# Patient Record
Sex: MALE | Race: WHITE | Employment: OTHER | URBAN - METROPOLITAN AREA
[De-identification: names, ages, dates, MRNs, and addresses within clinical notes are randomized per-mention and may not be internally consistent; named-entity substitution may affect disease eponyms.]

---

## 2021-04-26 ENCOUNTER — OFFICE VISIT (OUTPATIENT)
Dept: PODIATRY | Age: 71
End: 2021-04-26
Payer: MEDICARE

## 2021-04-26 VITALS — WEIGHT: 190 LBS | HEIGHT: 68 IN | BODY MASS INDEX: 28.79 KG/M2

## 2021-04-26 DIAGNOSIS — R26.2 DIFFICULTY WALKING: ICD-10-CM

## 2021-04-26 DIAGNOSIS — M79.675 PAIN OF TOE OF LEFT FOOT: ICD-10-CM

## 2021-04-26 DIAGNOSIS — L60.0 OC (ONYCHOCRYPTOSIS): Primary | ICD-10-CM

## 2021-04-26 PROCEDURE — 99203 OFFICE O/P NEW LOW 30 MIN: CPT | Performed by: PODIATRIST

## 2021-04-26 PROCEDURE — 11750 EXCISION NAIL&NAIL MATRIX: CPT | Performed by: PODIATRIST

## 2021-04-26 SDOH — HEALTH STABILITY: MENTAL HEALTH: HOW MANY STANDARD DRINKS CONTAINING ALCOHOL DO YOU HAVE ON A TYPICAL DAY?: NOT ASKED

## 2021-04-26 SDOH — HEALTH STABILITY: MENTAL HEALTH: HOW OFTEN DO YOU HAVE A DRINK CONTAINING ALCOHOL?: NOT ASKED

## 2021-04-26 NOTE — PROGRESS NOTES
Patient is here for nail care. Patient states that he has a possible ingrown toe nail to left great toe nail.  Delia Petersen DO  4/12/21  Electronically signed by Gauri Bui LPN on 0/73/9512 at 5:46 PM

## 2021-04-27 NOTE — PROGRESS NOTES
21     Shabnam Carter    : 1950 Sex: male   Age: 65 y.o. Patient was referred by: None  Patient's PCP/Provider is:  Carlos Sewell DO    Subjective:    Patient seen today chief complaint of painful ingrown nail left great toe. Chief Complaint   Patient presents with    Nail Problem       HPI: Patient stated the issue left great toe lateral border has been going on for several years. He has attempted multiple times in the past to trim it out himself with modest success. He presented today to discuss further treatment options for permanent removal.  He is in no acute distress. He denies any nausea, vomiting, fever, chills. ROS:  Const: Positives and pertinent negatives as per HPI. Musculo: Denies symptoms other than stated above. Neuro: Denies symptoms other than stated above. Skin: Denies symptoms other than stated above. Current Medications:  No current outpatient medications on file. Allergies:  No Known Allergies    Vitals:    21 1609   Weight: 190 lb (86.2 kg)   Height: 5' 8\" (1.727 m)        No past medical history on file. No family history on file. No past surgical history on file. Social History     Tobacco Use    Smoking status: Former Smoker    Smokeless tobacco: Never Used   Substance Use Topics    Alcohol use: Yes    Drug use: Never           Diagnostic studies:    No results found. Procedures:    Matrixectomy phenol alcohol    I discussed with the patient the procedure in extensive detail. We discussed the potential for recurrence, infection, prolonged drainage, delayed healing, overcorrection, undercorrection, recurrence of the deformity and potential need for further treatment. The patient understood. All questions were answered and all concerns were addressed. No guarantees were given. Upon receiving consent, the patient was brought to the treatment room and placed in the supine position where local anesthesia was achieved.   The local anesthesia consisted of 4 cc of 2% Xylocaine plain to the left great toe. The left great toe was prepped and draped in the usual fashion. Hemostasis was achieved via Penrose drain. At this point in time, a partial matrixectomy was performed along the lateral aspect of the left great toe. A spatula was used to free the lateral border of the left great toenail. Next, an English anvil was used to transect the nail border to the eponychium. Next, a 58 blade was utilized to transect the nail border proximal to the eponychium. The nail border was removed utilizing straight hemostats. Three 30 second applications of phenol were utilized to perform a chemical matrixectomy. The area was flushed with alcohol and dressed with topical antibiotic/Adaptic, 4x4, Eve, and Coban in a compressive fashion. The tourniquet was released and normal vascular capillary fill time was noted to return to the end of the digit. The patient tolerated the procedure and anesthesia well and left the office with vital signs stable and vascular status intact. The patient was given home going instructions and will reappoint for postperative follow up. Exam:  VASCULAR: Pedal pulses palpable left lower extremity. Capillary fill time brisk digits 1 through 5 left foot. NEUROLOGICAL: Epicritic sensations intact and symmetrical  DERMATOLOGICAL: Lateral border left great toe is incurvated with tenderness noted to palpation. No signs of infection noted left great toe. MUSCULOSKELETAL: Adequate range of motion left great toe IPJ and MTPJ region. Plan Per Assessment  Jayme Muir was seen today for nail problem. Diagnoses and all orders for this visit:    OC (onychocryptosis)    Pain of toe of left foot    Difficulty walking        1. New patient evaluation and management  2. Partial nail matrix procedure performed left great toe as described above to patient tolerance.   3. Patient was given home-going instructions to be performed on a daily basis regarding matrix procedure. 4. Patient will be followed up in 1 week's time or sooner if needed for reevaluation. He was advised to call the office with any questions or concerns in the interim. Seen By:    Mervat Arthur DPM    Electronically signed by Mervat Arthur DPM on 4/27/2021 at 8:34 AM      This note was created using voice recognition software. The note was reviewed however may contain grammatical errors.

## 2021-05-05 ENCOUNTER — PROCEDURE VISIT (OUTPATIENT)
Dept: PODIATRY | Age: 71
End: 2021-05-05

## 2021-05-05 VITALS — WEIGHT: 190 LBS | BODY MASS INDEX: 28.79 KG/M2 | HEIGHT: 68 IN

## 2021-05-05 DIAGNOSIS — L60.0 OC (ONYCHOCRYPTOSIS): Primary | ICD-10-CM

## 2021-05-05 PROCEDURE — 99024 POSTOP FOLLOW-UP VISIT: CPT | Performed by: PODIATRIST

## 2021-05-05 NOTE — PROGRESS NOTES
Patient is here post op left ingrown toe nail.  Jason Argueta DO 4/14/2021  Electronically signed by Noy Laura LPN on 8/7/4148 at 6:49 AM

## 2021-05-05 NOTE — PROGRESS NOTES
21     Niyah James    : 1950   Sex: male    Age: 65 y.o. Patient's PCP/Provider is:  Grant Vazquez DO    Subjective:  Patient is seen today for follow-up regarding ingrown nail issue left great toe lateral border. Patient tolerated the previous matrix procedure without issues. Overall patient is doing great at this time, and is very pleased with procedure performed. Chief Complaint   Patient presents with    Post-Op Check     left        ROS:  Const: Positives and pertinent negatives as per HPI. Musculo: Denies symptoms other than stated above. Neuro: Denies symptoms other than stated above. Skin: Denies symptoms other than stated above. Current Medications:  No current outpatient medications on file. Allergies:  No Known Allergies    Vitals:    21 0905   Weight: 190 lb (86.2 kg)   Height: 5' 8\" (1.727 m)       Exam:  Neurovascular status unchanged. Matrix site healed left great toe. No edema or signs of infection noted left great toe. Adequate range of motion left great toe. Diagnostic Studies:     No results found. Procedures:    None    Plan Per Assessment  Alayna Yanes was seen today for post-op check. Diagnoses and all orders for this visit:    OC (onychocryptosis)      1. Post matrix evaluation and management  2. We did advised the patient on appropriate nail trimming techniques to prevent reoccurrence. 3. Patient will be followed up at a later date if any further Podiatric issues arise. Seen By:    Johny Nicolas DPM    Electronically signed by Johny Nicolas DPM on 2021 at 9:34 AM    This note was created using voice recognition software. The note was reviewed however may contain grammatical errors.

## 2021-07-02 ENCOUNTER — OFFICE VISIT (OUTPATIENT)
Dept: PODIATRY | Age: 71
End: 2021-07-02
Payer: MEDICARE

## 2021-07-02 VITALS — BODY MASS INDEX: 28.79 KG/M2 | WEIGHT: 190 LBS | HEIGHT: 68 IN

## 2021-07-02 DIAGNOSIS — L60.0 OC (ONYCHOCRYPTOSIS): Primary | ICD-10-CM

## 2021-07-02 PROCEDURE — 99213 OFFICE O/P EST LOW 20 MIN: CPT | Performed by: PODIATRIST

## 2021-07-02 NOTE — PROGRESS NOTES
Patient is here for follow up IGTN. Patient states it this last week the nail has gotten more sore.  Ed Blood, DO 6/14/2021  Electronically signed by Luma Conthe LPN on 1/8/0301 at 51:11 AM

## 2021-07-03 NOTE — PROGRESS NOTES
21     Caleb Stanley    : 1950   Sex: male    Age: 65 y.o. Patient's PCP/Provider is:  Jose Sanchez DO    Subjective:  Patient is seen today for follow-up regarding continued swelling and mild drainage from his left great toe. Patient stated he noticed some increased skin in the lateral nail border which did cause increased swelling and pain. Patient is in no acute distress. He denies any nausea, vomiting, fever, chills. Chief Complaint   Patient presents with    Ingrown Toenail     issues with left IGTN       ROS:  Const: Positives and pertinent negatives as per HPI. Musculo: Denies symptoms other than stated above. Neuro: Denies symptoms other than stated above. Skin: Denies symptoms other than stated above. Current Medications:  No current outpatient medications on file. Allergies:  No Known Allergies    Vitals:    21 1126   Weight: 190 lb (86.2 kg)   Height: 5' 8\" (1.727 m)       Exam:  Neurovascular status unchanged. Mild onychocryptotic nail noted proximal nail border left great toe. Mild nail spicule noted in the region. Mild purulence noted which was expressed to patient tolerance on today's visit. No skin crepitation, or any ascending cellulitic issues noted left great toe. Diagnostic Studies:     No results found. Procedures:    None    Plan Per Assessment  [place] Roblero was seen today for ingrown toenail. Diagnoses and all orders for this visit:    OC (onychocryptosis)      1. Evaluation and management  2. Debridement ingrown nail border was performed to patient tolerance left great toe. No nail matrix procedure performed on today's visit. 3. Did recommend use of topical antibiotic to the region on a daily basis to reduce current symptoms.   4. Patient will be followed up at a later date for continued evaluation and care if any additional Podiatric issues arise      Seen By:    Dorice Boas, DPM    Electronically signed by Dorice Boas, DPMERCEDES on 7/2/2021 at 10:09 PM    This note was created using voice recognition software. The note was reviewed however may contain grammatical errors.